# Patient Record
Sex: FEMALE | Race: WHITE | Employment: OTHER | URBAN - METROPOLITAN AREA
[De-identification: names, ages, dates, MRNs, and addresses within clinical notes are randomized per-mention and may not be internally consistent; named-entity substitution may affect disease eponyms.]

---

## 2022-09-14 ENCOUNTER — OFFICE VISIT (OUTPATIENT)
Dept: ORTHOPEDIC SURGERY | Age: 87
End: 2022-09-14
Payer: MEDICARE

## 2022-09-14 VITALS — HEIGHT: 61 IN | BODY MASS INDEX: 24.55 KG/M2 | WEIGHT: 130 LBS

## 2022-09-14 DIAGNOSIS — M19.072 PRIMARY OSTEOARTHRITIS, LEFT ANKLE AND FOOT: ICD-10-CM

## 2022-09-14 DIAGNOSIS — M79.672 PAIN IN LEFT FOOT: Primary | ICD-10-CM

## 2022-09-14 DIAGNOSIS — M25.572 PAIN IN LEFT ANKLE AND JOINTS OF LEFT FOOT: ICD-10-CM

## 2022-09-14 PROCEDURE — 1123F ACP DISCUSS/DSCN MKR DOCD: CPT | Performed by: ORTHOPAEDIC SURGERY

## 2022-09-14 PROCEDURE — 20605 DRAIN/INJ JOINT/BURSA W/O US: CPT | Performed by: ORTHOPAEDIC SURGERY

## 2022-09-14 PROCEDURE — G8427 DOCREV CUR MEDS BY ELIG CLIN: HCPCS | Performed by: ORTHOPAEDIC SURGERY

## 2022-09-14 PROCEDURE — 1090F PRES/ABSN URINE INCON ASSESS: CPT | Performed by: ORTHOPAEDIC SURGERY

## 2022-09-14 PROCEDURE — 99214 OFFICE O/P EST MOD 30 MIN: CPT | Performed by: ORTHOPAEDIC SURGERY

## 2022-09-14 PROCEDURE — G8420 CALC BMI NORM PARAMETERS: HCPCS | Performed by: ORTHOPAEDIC SURGERY

## 2022-09-14 PROCEDURE — 1036F TOBACCO NON-USER: CPT | Performed by: ORTHOPAEDIC SURGERY

## 2022-09-14 RX ORDER — METHYLPREDNISOLONE ACETATE 40 MG/ML
40 INJECTION, SUSPENSION INTRA-ARTICULAR; INTRALESIONAL; INTRAMUSCULAR; SOFT TISSUE ONCE
Status: COMPLETED | OUTPATIENT
Start: 2022-09-14 | End: 2022-09-14

## 2022-09-14 RX ADMIN — METHYLPREDNISOLONE ACETATE 80 MG: 40 INJECTION, SUSPENSION INTRA-ARTICULAR; INTRALESIONAL; INTRAMUSCULAR; SOFT TISSUE at 15:37

## 2022-09-14 NOTE — PROGRESS NOTES
Name: Carisa Brown  YOB: 1935  Gender: female  MRN: [id number]    CC: Left foot pain    HPI:  04/07/2021: Diagnoses: Bilateral hindfoot and midfoot arthritis: Left hindfoot injection  09/14/2022: She reports a painful knot in the dorsal lateral foot    ROS/Meds/PSH/PMH/FH/SH: reviewed today    Tobacco:  reports that she has never smoked. She has never used smokeless tobacco.     Physical Examination:  Patient appears to be alert and oriented with acceptable appearance. No obvious distress or SOB  CV: appears to have acceptable vascular color and capillary refill  Neuro: appears to have mostly intact light touch sensation   Skin: Left ACP dorsal lateral foot pain and thickening  MS: Standing: Planovalgus: Gait age-dependent  Left = sinus Tarsi to calcaneocuboid pain; the knot to which she is referring to is the anterior calcaneal process/calcaneocuboid arthritis    XR: Left side: Standing AP lateral mortise ankle plus AP oblique foot taken today with naviculocuneiform and tarsometatarsal arthritis; hallux rigidus; tibial lesser toe deformities arterial calcification  XR Impression:  As above      Injection: We discussed the risks and complications of the procedure and the decision was made to proceed; the left hindfoot joint was injected with 2 cc Xylocaine: 80 mg Depo-Medrol; she appeared to do well:     Assessment:    Left planovalgus hindfoot arthritis; midfoot arthritis; Achilles contracture    Plan:   The patient and I discussed the above assessment. We explored treatment options.      She had concerns about whether the insoles created the dorsal lateral foot prominence  I do not feel the insoles are related to her problem, but instead, the prominence relates to anterior calcaneal process/calcaneocuboid arthritic exostoses and impingement pain  Hopefully after injection today she will see some improvement but she will also return to [address] [place] [address] medical imaging: No indication today for MRI scan or CT scan    DME: No indication for bracing  We discussed foot care and insole protection  PT: No indication for formal PT  Orthotic/prosthetic: Ros Moore: Please assess custom insoles to stress relief arthritic calcaneocuboid joint       Medication - OTC meds prn  Surgical discussion: We discussed future consideration for triple arthrodesis, Achilles lengthening, tarsometatarsal fusions. At her age, hopefully she can avoid surgery  Follow up: As needed  Work status: Regular    History and discussion of management with: Her     This note was created using Dragon voice recognition software which may result in errors of speech and spelling recognition and word/phrase syntax errors.

## 2023-03-20 ENCOUNTER — OFFICE VISIT (OUTPATIENT)
Dept: ORTHOPEDIC SURGERY | Age: 88
End: 2023-03-20
Payer: MEDICARE

## 2023-03-20 VITALS — BODY MASS INDEX: 24.55 KG/M2 | HEIGHT: 61 IN | WEIGHT: 130 LBS

## 2023-03-20 DIAGNOSIS — Z96.653 STATUS POST TOTAL BILATERAL KNEE REPLACEMENT USING CEMENT: Primary | ICD-10-CM

## 2023-03-20 PROCEDURE — G8484 FLU IMMUNIZE NO ADMIN: HCPCS | Performed by: PHYSICIAN ASSISTANT

## 2023-03-20 PROCEDURE — G8420 CALC BMI NORM PARAMETERS: HCPCS | Performed by: PHYSICIAN ASSISTANT

## 2023-03-20 PROCEDURE — 1123F ACP DISCUSS/DSCN MKR DOCD: CPT | Performed by: PHYSICIAN ASSISTANT

## 2023-03-20 PROCEDURE — G8427 DOCREV CUR MEDS BY ELIG CLIN: HCPCS | Performed by: PHYSICIAN ASSISTANT

## 2023-03-20 PROCEDURE — 99204 OFFICE O/P NEW MOD 45 MIN: CPT | Performed by: PHYSICIAN ASSISTANT

## 2023-03-20 PROCEDURE — 1036F TOBACCO NON-USER: CPT | Performed by: PHYSICIAN ASSISTANT

## 2023-03-20 PROCEDURE — 1090F PRES/ABSN URINE INCON ASSESS: CPT | Performed by: PHYSICIAN ASSISTANT

## 2023-03-20 NOTE — PROGRESS NOTES
Non-orthopaedic concerns were referred back to the primary care physician. PE:  bilateral Knee  Patient is comfortable and in no distress. The lower extremities are as described below. Circulation is normal with palpable pedal pulses bilaterally and no edema. There is no lymph adenopathy in the popliteal or malleolar region. The skin is without stasis disease distally bilaterally. Sensation is intact to light touch bilaterally. There is mild tenderness to palpation over the lateral joint line of the left knee(s)  The gait is noted to be normal  ROM: 0 to 120 degrees  AP translation: 4 mm  M-L laxity: 2 degrees  Alignment: 4 degrees of valgus  Quadriceps strength: excellent  Gait: no limp  Incision: well healed    Radiographs: AP/Lateral and sunrise of the bilateral knee taken in the office today reveal a good bone, prosthetic appearance. The patella is balanced. No obvious fractures or lucencies noted. On the sunrise view of the left knee, there does appear to be some inflammatory reaction that could represent a small stress fracture. No previous views to compare. Radiographic Diagnosis:  Stable total knee arthroplasty, bilaterally    Recommendations:  Reviewed x-ray findings with the patient. I cannot confidently rule out stress fracture of the patella of the left knee entirely. However, her pain is improving with conservative management and she does not have any restricted ROM or difficultly weight bearing. I recommend continued conservative management with Tylenol, ice, and rest for now. Follow up in 4 weeks for new Xrs if her pain has not improved.   ----The patient will be treated observantly with self directed symptomatic care. Instructions were given to follow up if there is any neurologic or functional decline.  ---- Topical NSAID: The patient is agreeable to a trial of topical nonsteroidal anti-inflammatory drugs (NSAIDs).    The patient was prescribed Diclofenac topical.    Continue

## 2023-04-28 ENCOUNTER — OFFICE VISIT (OUTPATIENT)
Dept: ORTHOPEDIC SURGERY | Age: 88
End: 2023-04-28

## 2023-04-28 DIAGNOSIS — M25.512 BILATERAL SHOULDER PAIN, UNSPECIFIED CHRONICITY: Primary | ICD-10-CM

## 2023-04-28 DIAGNOSIS — M12.812 ROTATOR CUFF ARTHROPATHY OF BOTH SHOULDERS: ICD-10-CM

## 2023-04-28 DIAGNOSIS — M19.012 OSTEOARTHRITIS OF BILATERAL GLENOHUMERAL JOINTS: ICD-10-CM

## 2023-04-28 DIAGNOSIS — M25.511 BILATERAL SHOULDER PAIN, UNSPECIFIED CHRONICITY: Primary | ICD-10-CM

## 2023-04-28 DIAGNOSIS — M12.811 ROTATOR CUFF ARTHROPATHY OF BOTH SHOULDERS: ICD-10-CM

## 2023-04-28 DIAGNOSIS — M19.011 OSTEOARTHRITIS OF BILATERAL GLENOHUMERAL JOINTS: ICD-10-CM

## 2023-04-28 RX ORDER — TRIAMCINOLONE ACETONIDE 40 MG/ML
40 INJECTION, SUSPENSION INTRA-ARTICULAR; INTRAMUSCULAR ONCE
Status: COMPLETED | OUTPATIENT
Start: 2023-04-28 | End: 2023-04-28

## 2023-04-28 RX ADMIN — TRIAMCINOLONE ACETONIDE 40 MG: 40 INJECTION, SUSPENSION INTRA-ARTICULAR; INTRAMUSCULAR at 12:15

## 2023-04-28 RX ADMIN — TRIAMCINOLONE ACETONIDE 40 MG: 40 INJECTION, SUSPENSION INTRA-ARTICULAR; INTRAMUSCULAR at 12:14

## 2023-04-28 NOTE — PROGRESS NOTES
Name: Melly Almonte  YOB: 1935  Gender: female  MRN: [id number]      CC: Shoulder Pain (Bilateral)       HPI: Melly Almonte is a new patient here for a bilateral shoulder evaluation. She present with mild ongoing pain and weakness in both shoulder that has chronically progressed over time. Her pain is primarily localized limiting her range of motion. She does report history of a left rotator cuff repair about 10 years ago. She denies seeking any recent formal treatment to address her pain prior to today. ROS/Meds/PSH/PMH/FH/SH: I personally reviewed the patients standard intake form. Below are the pertinents    Tobacco:  reports that she has never smoked. She has never used smokeless tobacco.  Diabetes: none  Other: A-fib, hypertension, GERD, CKD, asthma    Physical Examination:  General: no acute distress  Lungs: breathing easily  CV: regular rhythm by pulse  Right Shoulder: No obvious deformity of the biceps. Diffuse tenderness palpation throughout the shoulder. Active forward flexion to 170 degrees with pain in the extreme. External rotation with elbows at side 90 degrees. External rotation with elbows at side resisted range of motion 4+/5 strength. Positive empty can test with 4/5 strength in empty can position. Negative Albert Daisy, positive Neer's. Pain with Guadalupe's and speeds. Left shoulder: No obvious deformity of the biceps. Diffuse tenderness to palpation throughout the shoulder. Active forward flexion to 150 degrees limited by pain in the extreme. External rotation with elbows at side to 45 degrees which is decreased compared to the contralateral side. External rotation with elbows at side resisted range of motion 4+/5 strength. Positive empty can test with 4/5 strength empty can position. Negative Albert Daisy, positive Neer's. Pain with Guadalupe's and speeds.       Imaging:   Shoulder XR: Grashey, Axillary and Scapula Y views     Clinical

## 2023-08-01 ENCOUNTER — OFFICE VISIT (OUTPATIENT)
Dept: ORTHOPEDIC SURGERY | Age: 88
End: 2023-08-01
Payer: MEDICARE

## 2023-08-01 DIAGNOSIS — M12.812 ROTATOR CUFF ARTHROPATHY OF BOTH SHOULDERS: Primary | ICD-10-CM

## 2023-08-01 DIAGNOSIS — M12.811 ROTATOR CUFF ARTHROPATHY OF BOTH SHOULDERS: Primary | ICD-10-CM

## 2023-08-01 PROCEDURE — G8420 CALC BMI NORM PARAMETERS: HCPCS | Performed by: SPECIALIST/TECHNOLOGIST

## 2023-08-01 PROCEDURE — 99213 OFFICE O/P EST LOW 20 MIN: CPT | Performed by: SPECIALIST/TECHNOLOGIST

## 2023-08-01 PROCEDURE — 1123F ACP DISCUSS/DSCN MKR DOCD: CPT | Performed by: SPECIALIST/TECHNOLOGIST

## 2023-08-01 PROCEDURE — 1036F TOBACCO NON-USER: CPT | Performed by: SPECIALIST/TECHNOLOGIST

## 2023-08-01 PROCEDURE — 1090F PRES/ABSN URINE INCON ASSESS: CPT | Performed by: SPECIALIST/TECHNOLOGIST

## 2023-08-01 PROCEDURE — G8427 DOCREV CUR MEDS BY ELIG CLIN: HCPCS | Performed by: SPECIALIST/TECHNOLOGIST

## 2023-08-01 PROCEDURE — 20610 DRAIN/INJ JOINT/BURSA W/O US: CPT | Performed by: SPECIALIST/TECHNOLOGIST

## 2023-08-01 RX ORDER — TRIAMCINOLONE ACETONIDE 40 MG/ML
40 INJECTION, SUSPENSION INTRA-ARTICULAR; INTRAMUSCULAR ONCE
Status: COMPLETED | OUTPATIENT
Start: 2023-08-01 | End: 2023-08-01

## 2023-08-01 RX ADMIN — TRIAMCINOLONE ACETONIDE 40 MG: 40 INJECTION, SUSPENSION INTRA-ARTICULAR; INTRAMUSCULAR at 12:08

## 2023-08-01 RX ADMIN — TRIAMCINOLONE ACETONIDE 40 MG: 40 INJECTION, SUSPENSION INTRA-ARTICULAR; INTRAMUSCULAR at 12:07

## 2023-08-01 NOTE — PROGRESS NOTES
Name: Javy Hennessy  YOB: 1935  Gender: female  MRN: [id number]      CC: Shoulder Pain (R)       HPI: Javy Hennessy is a 80 y.o. female who returns for follow up on her bilateral shoulders. She reports that she got 90% relief for 3 weeks from the subacromial injections. She reports that she attended formal physical therapy which made her pain worse. Physical Examination:  General: no acute distress  Lungs: breathing easily  CV: regular rhythm by pulse  Right Shoulder: No obvious deformity of the biceps. Diffuse tenderness palpation throughout the shoulder. Active forward flexion to 150 degrees with pain in the extreme. External rotation with elbows at side 90 degrees. External rotation with elbows at side resisted range of motion 4+/5 strength. Positive empty can test with 4/5 strength in empty can position. Negative Maryfrances Cousin, positive Neer's. Pain with Lyme's and speeds. Left shoulder: No obvious deformity of the biceps. Diffuse tenderness to palpation throughout the shoulder. Active forward flexion to 150 degrees limited by pain in the extreme. External rotation with elbows at side to 45 degrees which is decreased compared to the contralateral side. External rotation with elbows at side resisted range of motion 4+/5 strength. Positive empty can test with 4/5 strength empty can position. Negative Maryfrances Cousin, positive Neer's. Pain with Lyme's and speeds        Assessment:   1. Rotator cuff arthropathy of both shoulders         Plan:   Patient with known osteoarthritis and rotator cuff arthropathy returns with excellent response to corticosteroid injection. I discussed with the patient that we can repeat these injections every 3 to 4 months and she wishes to proceed with repeat injection. She would also like to return to formal physical therapy therefore I provided her with a referral for formal physical therapy for 6-8 more weeks. The patient

## 2023-09-20 ENCOUNTER — CLINICAL DOCUMENTATION (OUTPATIENT)
Dept: ORTHOPEDIC SURGERY | Age: 88
End: 2023-09-20

## 2023-09-20 NOTE — PROGRESS NOTES
[facility]   [address] [place] [address]   [id number]   [place] [id number]    Disc and notes mailed out per patient request.